# Patient Record
Sex: FEMALE | Race: WHITE
[De-identification: names, ages, dates, MRNs, and addresses within clinical notes are randomized per-mention and may not be internally consistent; named-entity substitution may affect disease eponyms.]

---

## 2022-08-08 ENCOUNTER — HOSPITAL ENCOUNTER (EMERGENCY)
Dept: HOSPITAL 26 - MED | Age: 47
LOS: 1 days | Discharge: HOME | End: 2022-08-09
Payer: SELF-PAY

## 2022-08-08 VITALS — HEIGHT: 60 IN | BODY MASS INDEX: 33.96 KG/M2 | WEIGHT: 173 LBS

## 2022-08-08 VITALS — DIASTOLIC BLOOD PRESSURE: 89 MMHG | SYSTOLIC BLOOD PRESSURE: 134 MMHG

## 2022-08-08 DIAGNOSIS — E11.9: ICD-10-CM

## 2022-08-08 DIAGNOSIS — D64.9: ICD-10-CM

## 2022-08-08 DIAGNOSIS — N93.8: Primary | ICD-10-CM

## 2022-08-08 PROCEDURE — 93005 ELECTROCARDIOGRAM TRACING: CPT

## 2022-08-08 PROCEDURE — 84484 ASSAY OF TROPONIN QUANT: CPT

## 2022-08-08 PROCEDURE — 85730 THROMBOPLASTIN TIME PARTIAL: CPT

## 2022-08-08 PROCEDURE — 81001 URINALYSIS AUTO W/SCOPE: CPT

## 2022-08-08 PROCEDURE — 84702 CHORIONIC GONADOTROPIN TEST: CPT

## 2022-08-08 PROCEDURE — 76830 TRANSVAGINAL US NON-OB: CPT

## 2022-08-08 PROCEDURE — 85610 PROTHROMBIN TIME: CPT

## 2022-08-08 PROCEDURE — 85025 COMPLETE CBC W/AUTO DIFF WBC: CPT

## 2022-08-08 PROCEDURE — 87086 URINE CULTURE/COLONY COUNT: CPT

## 2022-08-08 PROCEDURE — 99285 EMERGENCY DEPT VISIT HI MDM: CPT

## 2022-08-08 PROCEDURE — 80048 BASIC METABOLIC PNL TOTAL CA: CPT

## 2022-08-08 PROCEDURE — 82948 REAGENT STRIP/BLOOD GLUCOSE: CPT

## 2022-08-08 PROCEDURE — 36415 COLL VENOUS BLD VENIPUNCTURE: CPT

## 2022-08-08 NOTE — NUR
48 Y/O F BIB SELF FOF VAGINAL BLEEDING X1 MONTH. BLOOD STARTS AS LIGHT RED THAN 
GOES TO DARK RED. PT USES THREE PADS PER DAY. PT LAST NORMAL PERIOD WAS 6/7 
MOTHS AGO. PT NOT ON BIRTHCONTROL. PT STATES SHE NOT INPAIN BUT IT FEELS LIKE 
"CRAMPING" PT ALSO STATES SHE FEEL WEAK LETHARGIC AND HAS PAIN IN FINGERTIPS. 
PT DENIES V/SOB/CHEST PAIN/ COUGH/ FEVER.  PT TOOK IBRUPROFEN FOR CRAMPING.

PMH: DIABETES

RX: CONTROLLED WITH DIET

PT IS ABULATORY, A&O X4 WITH STABLE VITALS

## 2022-08-09 VITALS — DIASTOLIC BLOOD PRESSURE: 89 MMHG | SYSTOLIC BLOOD PRESSURE: 134 MMHG

## 2022-08-09 LAB
AMORPH SED URNS QL MICRO: (no result) /HPF
ANION GAP SERPL CALCULATED.3IONS-SCNC: 13 MMOL/L (ref 8–16)
APPEARANCE UR: (no result)
BASOPHILS # BLD AUTO: 0 K/UL (ref 0–0.22)
BASOPHILS NFR BLD AUTO: 0.6 % (ref 0–2)
BILIRUB UR QL STRIP: NEGATIVE
BUN SERPL-MCNC: 11 MG/DL (ref 7–18)
CHLORIDE SERPL-SCNC: 102 MMOL/L (ref 98–107)
CO2 SERPL-SCNC: 26.6 MMOL/L (ref 21–32)
COLOR UR: YELLOW
CREAT SERPL-MCNC: 0.7 MG/DL (ref 0.6–1.3)
EOSINOPHIL # BLD AUTO: 0.1 K/UL (ref 0–0.4)
EOSINOPHIL NFR BLD AUTO: 1.2 % (ref 0–4)
ERYTHROCYTE [DISTWIDTH] IN BLOOD BY AUTOMATED COUNT: 17 % (ref 11.6–13.7)
GFR SERPL CREATININE-BSD FRML MDRD: 116 ML/MIN (ref 90–?)
GLUCOSE SERPL-MCNC: 206 MG/DL (ref 74–106)
GLUCOSE UR STRIP-MCNC: (no result) MG/DL
HCT VFR BLD AUTO: 27.9 % (ref 36–48)
HGB BLD-MCNC: 8.7 G/DL (ref 12–16)
HGB UR QL STRIP: (no result)
LEUKOCYTE ESTERASE UR QL STRIP: NEGATIVE
LYMPHOCYTES # BLD AUTO: 3.1 K/UL (ref 2.5–16.5)
LYMPHOCYTES NFR BLD AUTO: 40.9 % (ref 20.5–51.1)
MCH RBC QN AUTO: 21 PG (ref 27–31)
MCHC RBC AUTO-ENTMCNC: 31 G/DL (ref 33–37)
MCV RBC AUTO: 66.2 FL (ref 80–94)
MONOCYTES # BLD AUTO: 0.6 K/UL (ref 0.8–1)
MONOCYTES NFR BLD AUTO: 7.7 % (ref 1.7–9.3)
NEUTROPHILS # BLD AUTO: 3.8 K/UL (ref 1.8–7.7)
NEUTROPHILS NFR BLD AUTO: 49.6 % (ref 42.2–75.2)
NITRITE UR QL STRIP: NEGATIVE
PH UR STRIP: 6 [PH] (ref 5–9)
PLATELET # BLD AUTO: 349 K/UL (ref 140–450)
POTASSIUM SERPL-SCNC: 3.6 MMOL/L (ref 3.5–5.1)
PROTHROMBIN TIME: 9.8 SECS (ref 10.8–13.4)
RBC # BLD AUTO: 4.22 MIL/UL (ref 4.2–5.4)
RBC #/AREA URNS HPF: (no result) /HPF (ref 0–5)
SODIUM SERPL-SCNC: 138 MMOL/L (ref 136–145)
WBC # BLD AUTO: 7.6 K/UL (ref 4.8–10.8)
WBC,URINE: (no result) /HPF (ref 0–5)